# Patient Record
Sex: FEMALE
[De-identification: names, ages, dates, MRNs, and addresses within clinical notes are randomized per-mention and may not be internally consistent; named-entity substitution may affect disease eponyms.]

---

## 2021-01-10 ENCOUNTER — HOSPITAL ENCOUNTER (EMERGENCY)
Dept: HOSPITAL 5 - ED | Age: 36
Discharge: HOME | End: 2021-01-10
Payer: SELF-PAY

## 2021-01-10 VITALS — SYSTOLIC BLOOD PRESSURE: 113 MMHG | DIASTOLIC BLOOD PRESSURE: 77 MMHG
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
## 2021-01-10 DIAGNOSIS — S61.215A: Primary | ICD-10-CM

## 2021-01-10 DIAGNOSIS — Z79.899: ICD-10-CM

## 2021-01-10 DIAGNOSIS — Z98.890: ICD-10-CM

## 2021-01-10 NOTE — XRAY REPORT
LEFT FINGER 3 VIEWS



INDICATION / CLINICAL INFORMATION:

left finger lac



COMPARISON:

None available.

 

FINDINGS:



BONES / JOINT(S): No acute fracture or subluxation. No significant arthritis.

SOFT TISSUES: Soft tissue injury left fourth digit at the level the proximal phalanx anteriorly. No r
adiopaque foreign body.



ADDITIONAL FINDINGS: None.







Signer Name: Liam Mckee MD 

Signed: 1/10/2021 4:00 PM

Workstation Name: [id number]

## 2021-01-10 NOTE — EMERGENCY DEPARTMENT REPORT
ED Laceration HPI





- HPI


Chief Complaint: Wound/Laceration


Stated Complaint: LEFT FINGER LACERATION


Time Seen by Provider: 01/10/21 15:20


Occurred When: Today


Location: Upper Extremity


Severity: mild


Tetanus Status: Up to Date ()


Laceration Symptoms: Yes Pain, No Foreign Body Sensation, No Numbness, No 

Weakness


Other History: This is a 35-year-old female nontoxic, well nourished in 

appearance, no acute signs of distress presents to the ED with c/o of left ring 

finger lac that occurred today while cutting meat. Patient denies decreased 

sensation or range of motion.  Patient stated bleeding is under control.  Denies

any numbness, tingling, fever, chills, nausea, vomiting, chest pain, shortness 

of breath, headache or stiff neck.  Patient denies any allergies to significant 

past medical history.  Patient is that she is up-to-date with tetanus as of 

.





ED Review of Systems


ROS: 


Stated complaint: LEFT FINGER LACERATION


Other details as noted in HPI





Constitutional: denies: chills, fever


Eyes: denies: eye pain, eye discharge, vision change


ENT: denies: ear pain, throat pain


Respiratory: denies: cough, shortness of breath, wheezing


Cardiovascular: denies: chest pain, palpitations


Endocrine: no symptoms reported


Gastrointestinal: denies: abdominal pain, nausea, diarrhea


Genitourinary: denies: urgency, dysuria, discharge


Musculoskeletal: denies: back pain, joint swelling, arthralgia


Skin: denies: rash, lesions


Neurological: denies: headache, weakness, paresthesias


Psychiatric: denies: anxiety, depression


Hematological/Lymphatic: denies: easy bleeding, easy bruising





ED Past Medical Hx





- Past Medical History


Previous Medical History?: No





- Surgical History


Additional Surgical History: C SECTION





- Medications


Home Medications: 


                                Home Medications











 Medication  Instructions  Recorded  Confirmed  Last Taken  Type


 


Naproxen 500 mg PO Q12H PRN #12 tablet 01/10/21  Unknown Rx


 


Sulfamethoxazole/Trimethoprim 1 each PO BID #14 tablet 01/10/21  Unknown Rx





[Bactrim DS TAB]     














Laceration Physical Exam





- Exam


General: 


Vital signs noted. No distress. Alert and acting appropriately.





Wound Length (cm): 3 (Left ring finger)


Laceration Location: Upper Extremity


Laceration Exam: Yes Normal Distal CMS, No Foreign Body, No Exposed Tendon, 

Vessel, or Nerve, No Tendon Injury





ED Course


                                   Vital Signs











  01/10/21





  15:27


 


Temperature 98.3 F


 


Pulse Rate 81


 


Respiratory 22





Rate 


 


Blood Pressure 113/77


 


O2 Sat by Pulse 100





Oximetry 














- Reevaluation(s)


Reevaluation #1: 





01/10/21 16:29


Patient is speaking in full sentences with no signs of distress noted.





- Laceration /Wound Repair


  ** Left Finger


Wound Location: upper extremity (left ringer finger)


Wound Length (cm): 3


Wound's Depth, Shape: irregular, flap


Wound Explored: clean


Irrigated w/ Saline (ccs): 40


Betadine Prep?: Yes


Volume Anesthetic (ccs): 3 ( 0.5% bupivacaine plain)


Wound Repaired With: sutures


Suture Size/Type: 5:0, proline


Number of Sutures: 9


Layer Closure?: No


Sterile Dressing Applied?: Yes


Progress: 





Under sterile field, I used Betadine to clean the area.  I then used 40 mL of 

normal saline to flush the area.  I then used 0.5% bupivacaine plain and 

injected 3 mL for digital block to the left ring finger.  I then used a 5-0 Pro

malgorzata to suture the laceration.  Number of stitches 9.  I then applied a sterile 

4 x 4 with tape.  Minimal bleeding noted but is under control.  Patient 

tolerated procedure well with no signs of distress.





ED Medical Decision Making





- Radiology Data











Referring Physician: EDUAR PISANO


 


Patient Name: JASON LITTLE


 


Patient ID: [id number]


 


YOB: 1985


 


Sex: Female


 


Accession: [id number]


 


Report Date: 2021-01-10


 


Report Status: Finalized








[address] [address] 

XRay Report Signed Patient: JASON LITTLE MR#: [id number] : 1985 

Acct:[id number] Age/Sex: 35 / F ADM Date: 01/10/21 Loc: ED Attending Dr: 

Ordering Physician: EDUAR PISANO NP Date of Service: 01/10/21 Procedure(s): XR

finger(s) 2+V LT Accession Number(s): [id number] cc: EDUAR PISANO NP Fluoro Time 

In Minutes: LEFT FINGER 3 VIEWS INDICATION / CLINICAL INFORMATION: left finger 

lac COMPARISON: None available. FINDINGS: BONES / JOINT(S): No acute fracture or

subluxation. No significant arthritis. SOFT TISSUES: Soft tissue injury left 

fourth digit at the level the proximal phalanx anteriorly. No radiopaque foreign

body. ADDITIONAL FINDINGS: None. Signer Name: Liam Mckee MD Signed: 

1/10/2021 4:00 PM Workstation Name: [email or web address] Transcribed By: ES Dictated By: 

Liam Mckee MD Electronically Authenticated By: Liam Mckee MD 

Signed Date/Time: 01/10/21 1600 DD/DT: 01/10/21 1559 TD/TT: 





- Medical Decision Making





This is a 35-year-old female that presents with laceration.  Patient is stable 

and was examined by me.  The laceration suturing has been performed and has been

performed and patient tolerated well.  A sterile dressing has been applied.  

Patient was educated on proper wound care.  Patient is discharged with Bactrim. 

Patient was instructed to return in 10 days for suture removal.  Patient was 

instructed to refer to Follow-up with a primary care doctor in 3-5 days or if 

symptoms worsen and continue return to emergency room as soon as possible.  At 

time of discharge, the patient does not seem toxic or ill in appearance.  No 

acute signs of distress noted.  Patient agrees to discharge treatment plan of 

care.  No further questions noted by the patient.


Critical care attestation.: 


If time is entered above; I have spent that time in minutes in the direct care 

of this critically ill patient, excluding procedure time.








ED Disposition


Clinical Impression: 


Laceration of left ring finger


Qualifiers:


 Encounter type: initial encounter Damage to nail status: without damage Foreign

body presence: without foreign body Qualified Code(s): S61.215A - Laceration 

without foreign body of left ring finger without damage to nail, initial 

encounter





Disposition: DC-01 TO HOME OR SELFCARE


Is pt being admited?: No


Does the pt Need Aspirin: No


Condition: Stable


Instructions:  Laceration Care, Adult, Wound Care, Adult


Additional Instructions: 


Follow-up with a primary care doctor in 3-5 days or if symptoms worsen and 

continue return to emergency room as soon as possible. 





Return in 10 days for suture removal.


Prescriptions: 


Sulfamethoxazole/Trimethoprim [Bactrim DS TAB] 1 each PO BID #14 tablet


Naproxen 500 mg PO Q12H PRN #12 tablet


 PRN Reason: Pain , Severe (7-10)


Referrals: 


PRIMARY CARE,MD [Referring] - 3-5 Days


SARANYA ROSADO MD [Staff Physician] - 3-5 Days


Forms:  Work/School Release Form(ED)


Time of Disposition: 16:33

## 2022-01-12 ENCOUNTER — HOSPITAL ENCOUNTER (EMERGENCY)
Dept: HOSPITAL 5 - ED | Age: 37
Discharge: HOME | End: 2022-01-12
Payer: COMMERCIAL

## 2022-01-12 VITALS — SYSTOLIC BLOOD PRESSURE: 122 MMHG | DIASTOLIC BLOOD PRESSURE: 76 MMHG
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
## 2022-01-12 DIAGNOSIS — S80.10XA: ICD-10-CM

## 2022-01-12 DIAGNOSIS — S16.1XXA: Primary | ICD-10-CM
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
## 2022-01-12 PROCEDURE — 99283 EMERGENCY DEPT VISIT LOW MDM: CPT

## 2022-01-12 PROCEDURE — 71046 X-RAY EXAM CHEST 2 VIEWS: CPT

## 2022-01-12 PROCEDURE — 72040 X-RAY EXAM NECK SPINE 2-3 VW: CPT
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
